# Patient Record
Sex: FEMALE | Race: AMERICAN INDIAN OR ALASKA NATIVE
[De-identification: names, ages, dates, MRNs, and addresses within clinical notes are randomized per-mention and may not be internally consistent; named-entity substitution may affect disease eponyms.]

---

## 2019-07-30 ENCOUNTER — HOSPITAL ENCOUNTER (EMERGENCY)
Dept: HOSPITAL 5 - ED | Age: 39
Discharge: HOME | End: 2019-07-30
Payer: SELF-PAY

## 2019-07-30 VITALS — DIASTOLIC BLOOD PRESSURE: 65 MMHG | SYSTOLIC BLOOD PRESSURE: 104 MMHG

## 2019-07-30 DIAGNOSIS — L03.012: Primary | ICD-10-CM

## 2019-07-30 DIAGNOSIS — F17.200: ICD-10-CM

## 2019-07-30 PROCEDURE — 99282 EMERGENCY DEPT VISIT SF MDM: CPT

## 2019-07-30 NOTE — EMERGENCY DEPARTMENT REPORT
Upper Extremity





- HPI


Chief Complaint: Extremity Injury, Upper


Stated Complaint: LEFT MIDDLE FINGER PAIN


Time Seen by Provider: 07/30/19 05:08


Upper Extremity: Left Middle Finger (paronychia )


Occurred When: 3 Days


Mechanism: Unsure


Severity: moderate


Symptoms: Yes Pain with Movement, Yes Swelling, Yes Bruising/Ecchymosis, No 

Deformity, No Limited Range of Movement, No Numbness, No Weakness, No Laceration

or Abrasion


Other History: pt is a 40 y/o aaf  who presents for pain 

swelling left middle finger tip erythema pain aching, pain exacerbated by 

movement palpation, pain relieved by nothing pt denies fall injury or trauma





ED Review of Systems


ROS: 


Stated complaint: LEFT MIDDLE FINGER PAIN


Other details as noted in HPI





Constitutional: denies: chills, fever


Eyes: denies: eye pain, eye discharge, vision change


ENT: denies: ear pain, throat pain


Respiratory: denies: cough, shortness of breath, wheezing


Cardiovascular: denies: chest pain, palpitations


Endocrine: no symptoms reported


Gastrointestinal: denies: abdominal pain, nausea, diarrhea


Genitourinary: denies: urgency, dysuria, discharge


Musculoskeletal: joint swelling


Skin: lesions (pain swelling left middle finger tip ).  denies: rash


Neurological: denies: headache, weakness, paresthesias


Psychiatric: denies: anxiety, depression


Hematological/Lymphatic: denies: easy bleeding, easy bruising





ED Past Medical Hx





- Past Medical History


Previous Medical History?: No





- Surgical History


Past Surgical History?: No





- Social History


Smoking Status: Current Every Day Smoker


Substance Use Type: None





- Medications


Home Medications: 


                                Home Medications











 Medication  Instructions  Recorded  Confirmed  Last Taken  Type


 


cephALEXin [Keflex] 500 mg PO Q8HR 10 Days #30 cap 07/30/19  Unknown Rx


 


traMADol [Ultram] 50 mg PO Q6HR PRN #12 tablet 07/30/19  Unknown Rx














Upper Extremity Exam





- Exam


General: 


Vital signs noted. No distress. Alert and acting appropriately.





Head and Torso: No HEENT Abnormality, No Neck Tenderness, No Chest/Lungs 

Abnormality, No Abdominal Tenderness, No Back Tenderness


Shoulder Exam: Yes Normal Range of Motion in Shoulder, No Shoulder Tenderness, 

No Clavicle Tenderness, No Shoulder Deformity, No AC Joint Tenderness


Arm Exam: No Arm/Humerus Tenderness, No Arm Deformity


Elbow: No Elbow Tenderness, No Normal Range of Motion in Elbow, No Elbow 

Deformity


Forearm: No Forearm Tenderness, No Forearm Deformity, No Pain with Pronation, No

 Pain with Supination


Wrist: Yes Normal ROM in Wrist, No Wrist Tenderness, No Wrist Deformity, No 

Snuffbox Tenderness, No Pain with Axial Thumb Compression


Hand: Yes Hand Tenderness, Yes Digit Tenderness (pronychia finger tip ), Yes 

Normal ROM in Digit(s), No Hand Deformity, No Digit(s) Deformity, No Tendon 

Dysfunction


CMS Exam: Yes Normal Distal Pulses, Yes Normal Capillary Refill, Yes Normal 

Distal Sensation, No Broken Skin





- I & D


  ** Left Distal Finger


Type of Procedure: Simple


Site: left middle finger paronychia 


Blade Size: 11


I & D Procedure: betadine prep


Progress: 


left middle finger paronychia site cleaned with betadine solution, anesthesia 

with 1% lidocaine x 1 cc via digital block , inicision with 11 blade scaple to 

base or nail bed moderate purulent drainage nail bed probed and irrigated all 

bleeding is controlled sterile dressing applied, pt tolerated procedure with 

minimal distress, pt tolerated procedure with minimal distress. 





Critical care attestation.: 


If time is entered above; I have spent that time in minutes in the direct care 

of this critically ill patient, excluding procedure time.








ED Disposition


Clinical Impression: 


 Paronychia of finger of left hand





Disposition: DC-01 TO HOME OR SELFCARE


Is pt being admited?: No


Does the pt Need Aspirin: No


Condition: Stable


Instructions:  Paronychia (ED)


Prescriptions: 


cephALEXin [Keflex] 500 mg PO Q8HR 10 Days #30 cap


traMADol [Ultram] 50 mg PO Q6HR PRN #12 tablet


 PRN Reason: Pain


Referrals: 


[facility], [place] [Primary Care Provider] - 3-5 Days


Forms:  Work/School Release Form(ED)


Time of Disposition: 06:12

## 2019-10-01 ENCOUNTER — HOSPITAL ENCOUNTER (EMERGENCY)
Dept: HOSPITAL 5 - ED | Age: 39
Discharge: HOME | End: 2019-10-01
Payer: SELF-PAY

## 2019-10-01 VITALS — DIASTOLIC BLOOD PRESSURE: 50 MMHG | SYSTOLIC BLOOD PRESSURE: 98 MMHG

## 2019-10-01 DIAGNOSIS — Z79.899: ICD-10-CM

## 2019-10-01 DIAGNOSIS — B96.89: ICD-10-CM

## 2019-10-01 DIAGNOSIS — F17.200: ICD-10-CM

## 2019-10-01 DIAGNOSIS — N39.0: ICD-10-CM

## 2019-10-01 DIAGNOSIS — N76.0: Primary | ICD-10-CM

## 2019-10-01 LAB
BILIRUB UR QL STRIP: (no result)
BLOOD UR QL VISUAL: (no result)
MUCOUS THREADS #/AREA URNS HPF: (no result) /HPF
PH UR STRIP: 6 [PH] (ref 5–7)
PROT UR STRIP-MCNC: (no result) MG/DL
RBC #/AREA URNS HPF: 2 /HPF (ref 0–6)
UROBILINOGEN UR-MCNC: < 2 MG/DL (ref ?–2)
WBC #/AREA URNS HPF: 9 /HPF (ref 0–6)

## 2019-10-01 PROCEDURE — 81001 URINALYSIS AUTO W/SCOPE: CPT

## 2019-10-01 PROCEDURE — 96372 THER/PROPH/DIAG INJ SC/IM: CPT

## 2019-10-01 PROCEDURE — 87086 URINE CULTURE/COLONY COUNT: CPT

## 2019-10-01 PROCEDURE — 81025 URINE PREGNANCY TEST: CPT

## 2019-10-01 PROCEDURE — 87210 SMEAR WET MOUNT SALINE/INK: CPT

## 2019-10-01 PROCEDURE — 99284 EMERGENCY DEPT VISIT MOD MDM: CPT

## 2019-10-01 PROCEDURE — 87591 N.GONORRHOEAE DNA AMP PROB: CPT

## 2019-10-01 NOTE — EMERGENCY DEPARTMENT REPORT
ED Female  HPI





- General


Chief complaint: Urogenital-Female


Stated complaint: VAGINAL IRRITATION


Time Seen by Provider: 10/01/19 15:08


Source: patient


Mode of arrival: Ambulatory


Limitations: No Limitations





- History of Present Illness


Initial comments: 





Patient reports vaginal irritation with burning that started five days ago.


MD Complaint: possible STD


Onset/Timin


-: days(s)


Location: other (none)


Radiation: non-radiating


Severity: moderate


Severity scale (0 -10): 5


Quality: burning


Consistency: constant


Improves with: none


Worsens with: urination


Are you Pregnant Now?: No


Last Menstrual Period: 19


EDC: 20


Associated Symptoms: dysuria.  denies: vaginal discharge, vaginal bleeding, 

abdominal pain, nausea/vomiting, fever/chills, headaches, loss of appetite, 

hematuria, rash, seizure, shortness of breath, syncope, weakness





- Related Data


Sexually active: Yes


                                  Previous Rx's











 Medication  Instructions  Recorded  Last Taken  Type


 


cephALEXin [Keflex] 500 mg PO Q8HR 10 Days #30 cap 19 Unknown Rx


 


traMADol [Ultram] 50 mg PO Q6HR PRN #12 tablet 19 Unknown Rx


 


Nitrofurantoin Mono/M-Cryst 100 mg PO Q12HR #14 capsule 10/01/19 Unknown Rx





[Macrobid CAP]    


 


metroNIDAZOLE [Flagyl] 500 mg PO Q12HR #14 tab 10/01/19 Unknown Rx











                                    Allergies











Allergy/AdvReac Type Severity Reaction Status Date / Time


 


No Known Allergies Allergy   Verified 19 03:35














ED Review of Systems


ROS: 


Stated complaint: VAGINAL IRRITATION


Other details as noted in HPI





Constitutional: denies: chills, fever


Eyes: denies: eye pain, eye discharge, vision change


ENT: denies: ear pain, throat pain


Respiratory: denies: cough, shortness of breath, wheezing


Cardiovascular: denies: chest pain, palpitations


Endocrine: no symptoms reported


Gastrointestinal: denies: abdominal pain, nausea, vomiting, diarrhea


Genitourinary: other (vaginal irritation).  denies: urgency, dysuria, discharge


Musculoskeletal: denies: back pain, joint swelling, arthralgia


Skin: denies: rash, lesions


Neurological: denies: headache, weakness, paresthesias


Psychiatric: denies: anxiety, depression


Hematological/Lymphatic: denies: easy bleeding, easy bruising





ED Past Medical Hx





- Past Medical History


Previous Medical History?: No





- Surgical History


Past Surgical History?: No





- Social History


Smoking Status: Current Every Day Smoker


Substance Use Type: None





- Medications


Home Medications: 


                                Home Medications











 Medication  Instructions  Recorded  Confirmed  Last Taken  Type


 


cephALEXin [Keflex] 500 mg PO Q8HR 10 Days #30 cap 19  Unknown Rx


 


traMADol [Ultram] 50 mg PO Q6HR PRN #12 tablet 19  Unknown Rx


 


Nitrofurantoin Mono/M-Cryst 100 mg PO Q12HR #14 capsule 10/01/19  Unknown Rx





[Macrobid CAP]     


 


metroNIDAZOLE [Flagyl] 500 mg PO Q12HR #14 tab 10/01/19  Unknown Rx














ED Physical Exam





- General


Limitations: No Limitations





- Respiratory


Respiratory exam: Present: normal lung sounds bilaterally.  Absent: respiratory 

distress, wheezes, rales, rhonchi, stridor, chest wall tenderness, accessory 

muscle use, decreased breath sounds, prolonged expiratory





- Cardiovascular


Cardiovascular Exam: Present: regular rate, normal rhythm, normal heart sounds. 

 Absent: bradycardia, tachycardia, irregular rhythm





- GI/Abdominal


GI/Abdominal exam: Present: soft, normal bowel sounds.  Absent: distended, 

tenderness, guarding, rebound, rigid





- 


External exam: Present: erythema, swelling.  Absent: ecchymosis, bleeding


Speculum exam: Present: vaginal discharge (moderate creamy).  Absent: erythema, 

cervical discharge, vaginal bleeding, foreign body, tissue, laceration


Bi-manual exam: Present: normal bi-manual exam.  Absent: cervical motion 

tendernes, adnexal tenderness, adnexal mass, uterine enlargement, uterine 

tenderness





- Extremities Exam


Extremities exam: Present: normal inspection, full ROM, normal capillary refill.

  Absent: tenderness, pedal edema, joint swelling





- Back Exam


Back exam: Present: normal inspection, full ROM.  Absent: tenderness, CVA 

tenderness (R), CVA tenderness (L), muscle spasm, paraspinal tenderness, 

vertebral tenderness





- Neurological Exam


Neurological exam: Present: alert, oriented X3, CN II-XII intact, normal gait





- Psychiatric


Psychiatric exam: Present: normal affect, normal mood





- Skin


Skin exam: Present: warm, dry, intact, normal color.  Absent: rash





ED Course


                                   Vital Signs











  10/01/19





  15:08


 


Temperature 98.4 F


 


Pulse Rate 67


 


Respiratory 19





Rate 


 


Blood Pressure 98/50





[Left] 


 


O2 Sat by Pulse 99





Oximetry 














ED Medical Decision Making





- Lab Data








                                   Lab Results











  10/01/19 Range/Units





  Unknown 


 


Urine Color  Yellow  (Yellow)  


 


Urine Turbidity  Slightly-cloudy  (Clear)  


 


Urine pH  6.0  (5.0-7.0)  


 


Ur Specific Gravity  1.020  (1.003-1.030)  


 


Urine Protein  <15 mg/dl  (Negative)  mg/dL


 


Urine Glucose (UA)  Neg  (Negative)  mg/dL


 


Urine Ketones  Neg  (Negative)  mg/dL


 


Urine Blood  Neg  (Negative)  


 


Urine Nitrite  Neg  (Negative)  


 


Ur Reducing Substances  Not Reportable  


 


Urine Bilirubin  Neg  (Negative)  


 


Urine Ictotest  Not Reportable  


 


Urine Urobilinogen  < 2.0  (<2.0)  mg/dL


 


Ur Leukocyte Esterase  Lg  (Negative)  


 


Urine WBC (Auto)  9.0 H  (0.0-6.0)  /HPF


 


Urine RBC (Auto)  2.0  (0.0-6.0)  /HPF


 


U Epithel Cells (Auto)  5.0  (0-13.0)  /HPF


 


Urine Mucus  Few  /HPF


 


Urine HCG, Qual  Negative  (Negative)  











Microbiology





10/01/19 17:50   Cervix   Wet Prep - Final





Trichmoniasis seen


> = 20% Clue cells seen


No Yeast





                                   Vital Signs











  10/01/19





  15:08


 


Temperature 98.4 F


 


Pulse Rate 67


 


Respiratory 19





Rate 


 


Blood Pressure 98/50





[Left] 


 


O2 Sat by Pulse 99





Oximetry 














- Medical Decision Making





During the course of ED, pelvic exam and laboratory studies were ordered. 

Laboratory studies revealed positive for UTI, Trichomoniasis and clue cells. 

Patient was treated prophylactic in the ED for possible other STIs'. She was 

sent home with prescriptions for Macrobid and Flagyl, instructed to have sexual 

partner seek treatment at the local health department for STIs', she verbalized 

understanding





- Differential Diagnosis


Trichomoniasis, Bacterial Vaginosis, UTI, STI


Critical care attestation.: 


If time is entered above; I have spent that time in minutes in the direct care 

of this critically ill patient, excluding procedure time.








ED Disposition


Clinical Impression: 


 Trichomoniasis, Bacterial vaginosis





Urinary tract infection


Qualifiers:


 Urinary tract infection type: site unspecified Hematuria presence: without 

hematuria Qualified Code(s): N39.0 - Urinary tract infection, site not specified





Disposition:  TO HOME OR SELFCARE


Is pt being admited?: No


Does the pt Need Aspirin: No


Condition: Stable


Instructions:  Bacterial Vaginosis (ED), Trichomoniasis (ED), Urinary Tract 

Infection in Women (ED)


Additional Instructions: 


Take medication as directed. No drinking alcoholic beverages while taking 

antibiotics including 72 hours after completion of medications. Have sexual 

partner seek treatment at the local health department for sexual transmitted 

infections. Follow up with the selective referral given at discharge. Return 

back to the ED for worsening symptoms or concerns


Prescriptions: 


metroNIDAZOLE [Flagyl] 500 mg PO Q12HR #14 tab


Nitrofurantoin Mono/M-Cryst [Macrobid CAP] 100 mg PO Q12HR #14 capsule


Referrals: 


[facility] [Primary Care Provider] - 3-5 Days


[facility] [Outside] - 3-5 Days


[facility] [Outside] - 3-5 Days


Forms:  STI Treatment and Prevention, Work/School Release Form(ED)


Time of Disposition: 18:26

## 2019-10-01 NOTE — EVENT NOTE
ED Screening Note


Date of service: 10/01/19


Time: 15:09


ED Screening Note: 


39 y o female presents with irritation to vaginal area


denies vag d/c, dysuria, abd pain, vag bleed


LMP- 9/28/19





This initial assessment/diagnostic orders/clinical plan/treatment(s) is/are 

subject to change based on patients health status, clinical progression and re-

assessment by fellow clinical providers in the ED. Further treatment and workup 

at subsequent clinical providers discretion. Patient/guardian urged not to elope

from the ED as their condition may be serious if not clinically assessed and 

managed. 





Initial orders include: 


ua, upt

## 2019-10-12 ENCOUNTER — HOSPITAL ENCOUNTER (EMERGENCY)
Dept: HOSPITAL 5 - ED | Age: 39
Discharge: HOME | End: 2019-10-12
Payer: SELF-PAY

## 2019-10-12 VITALS — SYSTOLIC BLOOD PRESSURE: 109 MMHG | DIASTOLIC BLOOD PRESSURE: 65 MMHG

## 2019-10-12 DIAGNOSIS — F17.200: ICD-10-CM

## 2019-10-12 DIAGNOSIS — Z79.899: ICD-10-CM

## 2019-10-12 DIAGNOSIS — B37.3: Primary | ICD-10-CM

## 2019-10-12 LAB
BILIRUB UR QL STRIP: (no result)
BLOOD UR QL VISUAL: (no result)
MUCOUS THREADS #/AREA URNS HPF: (no result) /HPF
PH UR STRIP: 5 [PH] (ref 5–7)
PROT UR STRIP-MCNC: (no result) MG/DL
RBC #/AREA URNS HPF: 2 /HPF (ref 0–6)
UROBILINOGEN UR-MCNC: < 2 MG/DL (ref ?–2)
WBC #/AREA URNS HPF: 4 /HPF (ref 0–6)

## 2019-10-12 PROCEDURE — 81001 URINALYSIS AUTO W/SCOPE: CPT

## 2019-10-12 PROCEDURE — 99283 EMERGENCY DEPT VISIT LOW MDM: CPT

## 2019-10-12 NOTE — EMERGENCY DEPARTMENT REPORT
ED General Adult HPI





- General


Chief complaint: Medical Clearance


Stated complaint: FOLLOW UP VISIT


Time Seen by Provider: 10/12/19 19:26


Source: patient


Mode of arrival: Ambulatory


Limitations: No Limitations





- History of Present Illness


Initial comments: 


Pt is s 39 y/om aaf who tx'd for STD 7 days ago. Pt presents today for follow ua

to ensure positive tx. pt denies vaginal discharge , complains of yeast which is

normal reaction to abx for this patient. There are no other symptoms. 





Onset/Timin


-: week(s)


Severity scale (0 -10): 0





- Related Data


                                  Previous Rx's











 Medication  Instructions  Recorded  Last Taken  Type


 


cephALEXin [Keflex] 500 mg PO Q8HR 10 Days #30 cap 19 Unknown Rx


 


traMADol [Ultram] 50 mg PO Q6HR PRN #12 tablet 19 Unknown Rx


 


Nitrofurantoin Mono/M-Cryst 100 mg PO Q12HR #14 capsule 10/01/19 Unknown Rx





[Macrobid CAP]    


 


metroNIDAZOLE [Flagyl] 500 mg PO Q12HR #14 tab 10/01/19 Unknown Rx


 


Fluconazole [Diflucan TAB] 150 mg PO ONCE #1 tablet 10/12/19 Unknown Rx











                                    Allergies











Allergy/AdvReac Type Severity Reaction Status Date / Time


 


No Known Allergies Allergy   Verified 19 03:35














ED Review of Systems


ROS: 


Stated complaint: FOLLOW UP VISIT


Other details as noted in HPI





Constitutional: denies: chills, fever


Eyes: denies: eye pain, eye discharge, vision change


ENT: denies: ear pain, throat pain


Respiratory: denies: cough, shortness of breath, wheezing


Cardiovascular: denies: chest pain, palpitations


Endocrine: no symptoms reported


Gastrointestinal: denies: abdominal pain, nausea, diarrhea


Genitourinary: other (itching )


Musculoskeletal: denies: back pain, joint swelling, arthralgia


Skin: denies: rash, lesions


Neurological: denies: headache, weakness, paresthesias


Psychiatric: denies: anxiety, depression


Hematological/Lymphatic: denies: easy bleeding, easy bruising





ED Past Medical Hx





- Past Medical History


Previous Medical History?: No





- Surgical History


Past Surgical History?: No





- Social History


Smoking Status: Current Every Day Smoker





- Medications


Home Medications: 


                                Home Medications











 Medication  Instructions  Recorded  Confirmed  Last Taken  Type


 


cephALEXin [Keflex] 500 mg PO Q8HR 10 Days #30 cap 19  Unknown Rx


 


traMADol [Ultram] 50 mg PO Q6HR PRN #12 tablet 19  Unknown Rx


 


Nitrofurantoin Mono/M-Cryst 100 mg PO Q12HR #14 capsule 10/01/19  Unknown Rx





[Macrobid CAP]     


 


metroNIDAZOLE [Flagyl] 500 mg PO Q12HR #14 tab 10/01/19  Unknown Rx


 


Fluconazole [Diflucan TAB] 150 mg PO ONCE #1 tablet 10/12/19  Unknown Rx














ED Physical Exam





- General


Limitations: No Limitations


General appearance: alert, in no apparent distress





- Head


Head exam: Present: atraumatic, normocephalic





- Eye


Eye exam: Present: normal appearance





- ENT


ENT exam: Present: mucous membranes moist





- Neck


Neck exam: Present: normal inspection





- Respiratory


Respiratory exam: Present: normal lung sounds bilaterally.  Absent: respiratory 

distress





- Cardiovascular


Cardiovascular Exam: Present: regular rate, normal rhythm, normal heart sounds. 

Absent: systolic murmur, diastolic murmur, rubs, gallop





- GI/Abdominal


GI/Abdominal exam: Present: soft, normal bowel sounds.  Absent: distended, 

tenderness, bruit, hernia





- Rectal


Rectal exam: Present: deferred





- 


External exam: Present: other (exam deferred )





- Extremities Exam


Extremities exam: Present: normal inspection





- Back Exam


Back exam: Present: normal inspection, full ROM.  Absent: tenderness, CVA 

tenderness (R), CVA tenderness (L), rash noted





- Neurological Exam


Neurological exam: Present: alert, oriented X3





- Psychiatric


Psychiatric exam: Present: normal affect, normal mood





- Skin


Skin exam: Present: warm, dry, intact, normal color.  Absent: rash





ED Course





                                   Vital Signs











  10/12/19





  16:42


 


Temperature 98.2 F


 


Pulse Rate 79


 


Respiratory 16





Rate 


 


Blood Pressure 109/65





[Right] 


 


O2 Sat by Pulse 100





Oximetry 














ED Medical Decision Making





- Lab Data








Labs











  10/12/19





  Unknown


 


Urine Color  Yellow


 


Urine Turbidity  Slightly-cloudy


 


Urine pH  5.0


 


Ur Specific Gravity  1.023


 


Urine Protein  <15 mg/dl


 


Urine Glucose (UA)  Neg


 


Urine Ketones  Neg


 


Urine Blood  Neg


 


Urine Nitrite  Neg


 


Urine Bilirubin  Neg


 


Urine Urobilinogen  < 2.0


 


Ur Leukocyte Esterase  Tr


 


Urine WBC (Auto)  4.0


 


Urine RBC (Auto)  2.0


 


U Epithel Cells (Auto)  16.0 H


 


Urine Mucus  Few














- Medical Decision Making


plan: will tx for yeast, follow up with pcp in 2-3 days. Pt verbalized agreement

 and understanding of discharge plan. 





Critical care attestation.: 


If time is entered above; I have spent that time in minutes in the direct care 

of this critically ill patient, excluding procedure time.








ED Disposition


Clinical Impression: 


 Candidal vaginitis





Disposition: DC-01 TO HOME OR SELFCARE


Is pt being admited?: No


Does the pt Need Aspirin: No


Condition: Stable


Prescriptions: 


Fluconazole [Diflucan TAB] 150 mg PO ONCE #1 tablet


Referrals: 


[facility] [Outside] - 3-5 Days


Forms:  Work/School Release Form(ED)

## 2020-08-06 ENCOUNTER — HOSPITAL ENCOUNTER (EMERGENCY)
Dept: HOSPITAL 5 - ED | Age: 40
Discharge: LEFT BEFORE DISCHARGE | End: 2020-08-06
Payer: SELF-PAY

## 2020-08-06 DIAGNOSIS — R51: Primary | ICD-10-CM

## 2020-08-06 DIAGNOSIS — Z53.21: ICD-10-CM

## 2020-12-14 ENCOUNTER — HOSPITAL ENCOUNTER (EMERGENCY)
Dept: HOSPITAL 5 - ED | Age: 40
Discharge: HOME | End: 2020-12-14
Payer: SELF-PAY

## 2020-12-14 VITALS — SYSTOLIC BLOOD PRESSURE: 122 MMHG | DIASTOLIC BLOOD PRESSURE: 74 MMHG

## 2020-12-14 DIAGNOSIS — Z79.899: ICD-10-CM

## 2020-12-14 DIAGNOSIS — F17.200: ICD-10-CM

## 2020-12-14 DIAGNOSIS — E87.6: ICD-10-CM

## 2020-12-14 DIAGNOSIS — J20.9: Primary | ICD-10-CM

## 2020-12-14 LAB
ALBUMIN SERPL-MCNC: 4.1 G/DL (ref 3.9–5)
ALT SERPL-CCNC: 16 UNITS/L (ref 7–56)
BASOPHILS # (AUTO): 0 K/MM3 (ref 0–0.1)
BASOPHILS NFR BLD AUTO: 0.1 % (ref 0–1.8)
BUN SERPL-MCNC: 6 MG/DL (ref 7–17)
BUN/CREAT SERPL: 9 %
CALCIUM SERPL-MCNC: 9.6 MG/DL (ref 8.4–10.2)
EOSINOPHIL # BLD AUTO: 0.1 K/MM3 (ref 0–0.4)
EOSINOPHIL NFR BLD AUTO: 1 % (ref 0–4.3)
HCT VFR BLD CALC: 41.3 % (ref 30.3–42.9)
HEMOLYSIS INDEX: 11
HGB BLD-MCNC: 13.5 GM/DL (ref 10.1–14.3)
LYMPHOCYTES # BLD AUTO: 2.3 K/MM3 (ref 1.2–5.4)
LYMPHOCYTES NFR BLD AUTO: 18.1 % (ref 13.4–35)
MCHC RBC AUTO-ENTMCNC: 33 % (ref 30–34)
MCV RBC AUTO: 93 FL (ref 79–97)
MONOCYTES # (AUTO): 0.9 K/MM3 (ref 0–0.8)
MONOCYTES % (AUTO): 6.6 % (ref 0–7.3)
PLATELET # BLD: 306 K/MM3 (ref 140–440)
RBC # BLD AUTO: 4.45 M/MM3 (ref 3.65–5.03)

## 2020-12-14 PROCEDURE — 85025 COMPLETE CBC W/AUTO DIFF WBC: CPT

## 2020-12-14 PROCEDURE — 36415 COLL VENOUS BLD VENIPUNCTURE: CPT

## 2020-12-14 PROCEDURE — 80053 COMPREHEN METABOLIC PANEL: CPT

## 2020-12-14 PROCEDURE — 71046 X-RAY EXAM CHEST 2 VIEWS: CPT

## 2020-12-14 NOTE — XRAY REPORT
CHEST 2 VIEWS 



INDICATION / CLINICAL INFORMATION:

cough for a week.



COMPARISON: 

None available.



FINDINGS:



SUPPORT DEVICES: None.

HEART / MEDIASTINUM: No significant abnormality. 

LUNGS / PLEURA: No significant pulmonary or pleural abnormality. No pneumothorax. 



ADDITIONAL FINDINGS: No significant additional findings.



IMPRESSION:

No significant abnormality



Signer Name: Jann Alexander MD FACR 

Signed: 12/14/2020 1:37 PM

Workstation Name: [id number]

## 2020-12-14 NOTE — EMERGENCY DEPARTMENT REPORT
- General


Chief Complaint: Upper Respiratory Infection


Stated Complaint: BAD COUGH/SOB/NIGHT SWEATS


Time Seen by Provider: 12/14/20 12:42


Source: patient


Mode of arrival: Ambulatory


Limitations: No Limitations





- History of Present Illness


Initial Comments: 





Patient is a 40-year-old female who presents emergency room complaints of cold 

symptoms that began a week ago.  She has an associated cough with mucus 

production.  She states that she is also been having chills, generalized body 

aches, headache, shortness of breath after coughing.  She denies any fever, 

nausea, vomiting, diarrhea, chest pain, abdominal pain.  She denies any known 

sick contacts.  She denies any recent travel.  She has a past medical history of

HIV and reports that she is undetectable.  She states that she is on her 

antivirals.  She states that she goes to [facility].  She denies any allergies

to medications.  She is a current smoker.





- Related Data


                                  Previous Rx's











 Medication  Instructions  Recorded  Last Taken  Type


 


cephALEXin [Keflex] 500 mg PO Q8HR 10 Days #30 cap 07/30/19 Unknown Rx


 


traMADoL [Ultram] 50 mg PO Q6HR PRN #12 tablet 07/30/19 Unknown Rx


 


Nitrofurantoin Mono/M-Cryst 100 mg PO Q12HR #14 capsule 10/01/19 Unknown Rx





[Macrobid CAP]    


 


metroNIDAZOLE [Flagyl] 500 mg PO Q12HR #14 tab 10/01/19 Unknown Rx


 


Fluconazole (Nf) [Diflucan TAB] 150 mg PO ONCE #1 tablet 10/12/19 Unknown Rx


 


Albuterol Sulfate [Proventil Hfa] 6.7 gm IH TID PRN #1 hfa.aer.ad 12/14/20 

Unknown Rx


 


Azithromycin [Zithromax TAB] 250 mg PO QDAY 5 Days #6 tablet 12/14/20 Unknown Rx


 


Benzonatate [Tessalon Perles] 100 mg PO Q8HR PRN #10 capsule 12/14/20 Unknown Rx


 


Potassium Chloride [K-Dur] 20 meq PO BID 1 Days #2 tab 12/14/20 Unknown Rx


 


Prednisone [predniSONE 10 mg 10 mg PO .TAPER #1 tab.ds.pk 12/14/20 Unknown Rx





(6-Day Pack, 21 Tabs)]    











                                    Allergies











Allergy/AdvReac Type Severity Reaction Status Date / Time


 


No Known Allergies Allergy   Verified 07/30/19 03:35














ED Review of Systems


ROS: 


Stated complaint: BAD COUGH/SOB/NIGHT SWEATS


Other details as noted in HPI





Comment: All other systems reviewed and negative





ED Past Medical Hx





- Past Medical History


Previous Medical History?: No





- Surgical History


Past Surgical History?: No





- Social History


Smoking Status: Current Some Day Smoker


Substance Use Type: None





- Medications


Home Medications: 


                                Home Medications











 Medication  Instructions  Recorded  Confirmed  Last Taken  Type


 


cephALEXin [Keflex] 500 mg PO Q8HR 10 Days #30 cap 07/30/19  Unknown Rx


 


traMADoL [Ultram] 50 mg PO Q6HR PRN #12 tablet 07/30/19  Unknown Rx


 


Nitrofurantoin Mono/M-Cryst 100 mg PO Q12HR #14 capsule 10/01/19  Unknown Rx





[Macrobid CAP]     


 


metroNIDAZOLE [Flagyl] 500 mg PO Q12HR #14 tab 10/01/19  Unknown Rx


 


Fluconazole (Nf) [Diflucan TAB] 150 mg PO ONCE #1 tablet 10/12/19  Unknown Rx


 


Albuterol Sulfate [Proventil Hfa] 6.7 gm IH TID PRN #1 hfa.aer.ad 12/14/20  

Unknown Rx


 


Azithromycin [Zithromax TAB] 250 mg PO QDAY 5 Days #6 tablet 12/14/20  Unknown 

Rx


 


Benzonatate [Tessalon Perles] 100 mg PO Q8HR PRN #10 capsule 12/14/20  Unknown 

Rx


 


Potassium Chloride [K-Dur] 20 meq PO BID 1 Days #2 tab 12/14/20  Unknown Rx


 


Prednisone [predniSONE 10 mg 10 mg PO .TAPER #1 tab.ds.pk 12/14/20  Unknown Rx





(6-Day Pack, 21 Tabs)]     














ED Physical Exam





- General


Limitations: No Limitations


General appearance: alert, in no apparent distress





- Head


Head exam: Present: atraumatic, normocephalic





- Eye


Eye exam: Present: normal appearance





- ENT


ENT exam: Present: mucous membranes moist





- Respiratory


Respiratory exam: Present: rhonchi (left lower lobe).  Absent: respiratory 

distress, wheezes, rales, stridor, chest wall tenderness, accessory muscle use, 

decreased breath sounds, prolonged expiratory





- Cardiovascular


Cardiovascular Exam: Present: regular rate, normal rhythm, normal heart sounds. 

Absent: systolic murmur, diastolic murmur, rubs, gallop





- Neurological Exam


Neurological exam: Present: alert, oriented X3





- Psychiatric


Psychiatric exam: Present: normal affect, normal mood





- Skin


Skin exam: Present: warm, dry, intact





ED Course


                                   Vital Signs











  12/14/20





  12:16


 


Temperature 98.9 F


 


Pulse Rate 64


 


Respiratory 20





Rate 


 


Blood Pressure 122/74


 


O2 Sat by Pulse 97





Oximetry 














ED Medical Decision Making





- Lab Data


Result diagrams: 


                                 12/14/20 13:12





                                 12/14/20 13:12








                                   Lab Results











  12/14/20 12/14/20 Range/Units





  13:12 13:12 


 


WBC  12.9 H   (4.5-11.0)  K/mm3


 


RBC  4.45   (3.65-5.03)  M/mm3


 


Hgb  13.5   (10.1-14.3)  gm/dl


 


Hct  41.3   (30.3-42.9)  %


 


MCV  93   (79-97)  fl


 


MCH  30   (28-32)  pg


 


MCHC  33   (30-34)  %


 


RDW  13.7   (13.2-15.2)  %


 


Plt Count  306   (140-440)  K/mm3


 


Lymph % (Auto)  18.1   (13.4-35.0)  %


 


Mono % (Auto)  6.6   (0.0-7.3)  %


 


Eos % (Auto)  1.0   (0.0-4.3)  %


 


Baso % (Auto)  0.1   (0.0-1.8)  %


 


Lymph # (Auto)  2.3   (1.2-5.4)  K/mm3


 


Mono # (Auto)  0.9 H   (0.0-0.8)  K/mm3


 


Eos # (Auto)  0.1   (0.0-0.4)  K/mm3


 


Baso # (Auto)  0.0   (0.0-0.1)  K/mm3


 


Seg Neutrophils %  74.2 H   (40.0-70.0)  %


 


Seg Neutrophils #  9.6 H   (1.8-7.7)  K/mm3


 


Sodium   136 L  (137-145)  mmol/L


 


Potassium   3.4 L  (3.6-5.0)  mmol/L


 


Chloride   100.2  ()  mmol/L


 


Carbon Dioxide   26  (22-30)  mmol/L


 


Anion Gap   13  mmol/L


 


BUN   6 L  (7-17)  mg/dL


 


Creatinine   0.7  (0.6-1.2)  mg/dL


 


Estimated GFR   > 60  ml/min


 


BUN/Creatinine Ratio   9  %


 


Glucose   79  ()  mg/dL


 


Calcium   9.6  (8.4-10.2)  mg/dL


 


Total Bilirubin   1.10  (0.1-1.2)  mg/dL


 


AST   17  (5-40)  units/L


 


ALT   16  (7-56)  units/L


 


Alkaline Phosphatase   79  ()  units/L


 


Total Protein   8.1  (6.3-8.2)  g/dL


 


Albumin   4.1  (3.9-5)  g/dL


 


Albumin/Globulin Ratio   1.0  %














- Radiology Data


Radiology results: report reviewed





Ordering Physician: MARKOS FIGUEROA 


 Date of Service: 12/14/20 


 Procedure(s): XR chest routine 2V 


 Accession Number(s): [id number] 





 cc: MARKOS FIGUEROA 





 Fluoro Time In Minutes: 





 CHEST 2 VIEWS 





INDICATION / CLINICAL INFORMATION: 


cough for a week. 





COMPARISON: 


None available. 





FINDINGS: 





SUPPORT DEVICES: None. 


HEART / MEDIASTINUM: No significant abnormality. 


LUNGS / PLEURA: No significant pulmonary or pleural abnormality. No 

pneumothorax. 





ADDITIONAL FINDINGS: No significant additional findings. 





IMPRESSION: 


No significant abnormality 





Signer Name: Jann Alexander MD FACR 


Signed: 12/14/2020 1:37 PM 


Workstation Name: [id number] 








 Transcribed By: MS 


 Dictated By: Jann Alexander MD 


 Electronically Authenticated By: Jann Alexander MD 


 Signed Date/Time: 12/14/20 1337 











 DD/DT: 12/14/20 1337 


 TD/TT: 





- Medical Decision Making





Patient is a 40-year-old female who presents emergency room complaints of cold 

symptoms that began a week ago.  She has an associated cough with mucus 

production.  She states that she is also been having chills, generalized body 

aches, headache, shortness of breath after coughing.  She denies any fever, 

nausea, vomiting, diarrhea, chest pain, abdominal pain.  She denies any known 

sick contacts.  She denies any recent travel.  She has a past medical history of

HIV and reports that she is undetectable.  She states that she is on her 

antivirals.  She states that she goes to [facility].  She denies any allergies

to medications.  She is a current smoker.  Labs with mildly elevated white count

mild hypokalemia at 3.4.  Patient given prescription for K-Dur. CXR: No 

significant abnormality. on exam: pt has left lower lobe rhonchi, no rales, no 

stridor, no wheezing, no respiratory distress, no accessory muscle use. symptoms

likely related to acute bronchitis.  Patient is presenting with the symptoms 

during COVID-19 pandemic, discussed COVID-19 with patient, discussed strict 

return precautions, discussed outpatient testing, discussed self quarantine.  

Patient given prescription for azithromycin, prednisone, albuterol inhaler, 

Tessalon Perles.  Advised patient Please take medication as prescribed.  Please 

increase your fluid intake over the next several days.  May take Tylenol as 

needed for fever or body aches.  May take over-the-counter cold symptom relief 

medication such as Mucinex or TheraFlu.  Follow-up with a primary care doctor 

for reexamination.  Return to emergency room immediately for any new or 

worsening symptoms including but not limited to difficulty breathing, shortness 

of breath, severe chest pain, unable to tolerate by mouth intake, etc.  Please 

self quarantine for 2 weeks from the onset of your symptoms.  Please do not go 

out in public.  If you are around others at home please wear a mask.  If you 

need to cough or sneeze please do so in a napkin and immediately throw it away 

and immediately wash your hands.  Wash your hands frequently.  Wipe everything 

down.  Recommend for you to get COVID-19 testing, may have this done at primary 

care doctor, health department, [facility] testing center.





- Differential Diagnosis


PNA, URI, acute bronchitis, viral syndrome, COVID 19


Critical care attestation.: 


If time is entered above; I have spent that time in minutes in the direct care 

of this critically ill patient, excluding procedure time.








ED Disposition


Clinical Impression: 


 Hypokalemia





Acute bronchitis


Qualifiers:


 Bronchitis organism: unspecified organism Qualified Code(s): J20.9 - Acute 

bronchitis, unspecified





Disposition: DC-01 TO HOME OR SELFCARE


Is pt being admited?: No


Does the pt Need Aspirin: No


Condition: Stable


Instructions:  Hypokalemia, Acute Bronchitis, Adult, Easy-to-Read, Potassium 

Content of Foods, Acute Bronchitis (ED)


Additional Instructions: 


Please take medication as prescribed.  Please increase your fluid intake over 

the next several days.  May take Tylenol as needed for fever or body aches.  May

take over-the-counter cold symptom relief medication such as Mucinex or 

TheraFlu.  Follow-up with a primary care doctor for reexamination.  Return to 

emergency room immediately for any new or worsening symptoms including but not 

limited to difficulty breathing, shortness of breath, severe chest pain, unable 

to tolerate by mouth intake, etc.  Please self quarantine for 2 weeks from the 

onset of your symptoms.  Please do not go out in public.  If you are around 

others at home please wear a mask.  If you need to cough or sneeze please do so 

in a napkin and immediately throw it away and immediately wash your hands.  Wash

your hands frequently.  Wipe everything down.  Recommend for you to get COVID-19

testing, may have this done at primary care doctor, health department, [facility] testing center.


Prescriptions: 


Potassium Chloride [K-Dur] 20 meq PO BID 1 Days #2 tab


Prednisone [predniSONE 10 mg (6-Day Pack, 21 Tabs)] 10 mg PO .TAPER #1 tab.ds.pk


Albuterol Sulfate [Proventil Hfa] 6.7 gm IH TID PRN #1 hfa.aer.ad


 PRN Reason: Shortness Of Breath


Benzonatate [Tessalon Perles] 100 mg PO Q8HR PRN #10 capsule


 PRN Reason: cough


Azithromycin [Zithromax TAB] 250 mg PO QDAY 5 Days #6 tablet


Referrals: 


PRIMARY CARE,MD [Primary Care Provider] - 2-3 Days


FRANNIE LEONARD MD [Staff Physician] - 2-3 Days


[facility] [Provider Group] - 2-3 Days


Time of Disposition: 14:38


Print Language: ENGLISH

## 2021-01-20 ENCOUNTER — HOSPITAL ENCOUNTER (EMERGENCY)
Dept: HOSPITAL 5 - ED | Age: 41
Discharge: HOME | End: 2021-01-20
Payer: SELF-PAY

## 2021-01-20 VITALS — DIASTOLIC BLOOD PRESSURE: 72 MMHG | SYSTOLIC BLOOD PRESSURE: 112 MMHG
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
## 2021-01-20 DIAGNOSIS — S05.01XA: Primary | ICD-10-CM
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
## 2021-01-20 PROCEDURE — 99282 EMERGENCY DEPT VISIT SF MDM: CPT

## 2021-01-20 NOTE — EMERGENCY DEPARTMENT REPORT
Eye Injury/Foreign Body





- HPI


Duration: 2 Days


Eye Location: Right


Severity: Mild


Tetanus Status: Up to Date


Eye Symptoms: Eye Pain: No, Blurred Vision: No, Eye Redness: Yes, 

Grinding/Hammering Metal: No, Used Eye Protection: No, Contact Lens Use: No, 

Recalls Injury: No, Photophobia: No


Other History: 39 yo woke up with swelling and fb sensation in r eye.  She has 

had recent uri.  no known trauma.  no contacts





ED Review of Systems


ROS: 


Stated complaint: EYE PAIN


Other details as noted in HPI





Comment: All other systems reviewed and negative





ED Past Medical Hx





- Past Medical History


Previous Medical History?: No





- Surgical History


Past Surgical History?: No





- Family History


Family history: no significant





- Social History


Smoking Status: Never Smoker


Substance Use Type: None





Eye Injury Exam





- Exam


General: 


Vital signs noted. No distress. Alert and acting appropriately.


va no change


no eye pain


globe intact


eoms intact


mild periorb swelling r eye


mild conjunctival redness





fluros. stain p 2 gtt tetracaine


abrasion noted across cornea


no fb











ED Course


                                   Vital Signs











  01/20/21





  09:16


 


Temperature 98.1 F


 


Pulse Rate 86


 


Respiratory 20





Rate 


 


Blood Pressure 112/72


 


O2 Sat by Pulse 96





Oximetry 














ED Medical Decision Making





- Medical Decision Making





                                   Vital Signs











  01/20/21





  09:16


 


Temperature 98.1 F


 


Pulse Rate 86


 


Respiratory 20





Rate 


 


Blood Pressure 112/72


 


O2 Sat by Pulse 96





Oximetry 








pos abrasion on exam 





dc home with dc poc- polymyxin eye drops and eye MD follow up. Pt verbalizes 

understanding of dc poc 





- Differential Diagnosis


ro fb/abrasion


Critical care attestation.: 


If time is entered above; I have spent that time in minutes in the direct care 

of this critically ill patient, excluding procedure time.








ED Disposition


Clinical Impression: 


 Corneal abrasion, right





Disposition: DC-01 TO HOME OR SELFCARE


Is pt being admited?: No


Does the pt Need Aspirin: No


Condition: Stable


Instructions:  Corneal Abrasion, Easy-to-Read


Prescriptions: 


Ciprofloxacin HCl/Dexameth [Ciprodex Otic Suspension] 2 drop OD BID #7.5 ml


Referrals: 


FRANNIE LEONARD MD [Staff Physician] - 3-5 Days


KAREL HANKINS MD [Staff Physician] - 3-5 Days


Time of Disposition: 09:19